# Patient Record
Sex: MALE | Race: OTHER
[De-identification: names, ages, dates, MRNs, and addresses within clinical notes are randomized per-mention and may not be internally consistent; named-entity substitution may affect disease eponyms.]

---

## 2019-09-02 ENCOUNTER — HOSPITAL ENCOUNTER (EMERGENCY)
Dept: HOSPITAL 54 - ER | Age: 19
LOS: 4 days | Discharge: HOME | End: 2019-09-06
Payer: SELF-PAY

## 2019-09-02 VITALS — HEIGHT: 64 IN | BODY MASS INDEX: 22.36 KG/M2 | WEIGHT: 131 LBS

## 2019-09-02 VITALS — DIASTOLIC BLOOD PRESSURE: 90 MMHG | SYSTOLIC BLOOD PRESSURE: 129 MMHG

## 2019-09-02 DIAGNOSIS — R94.31: ICD-10-CM

## 2019-09-02 DIAGNOSIS — F41.9: Primary | ICD-10-CM

## 2019-09-02 NOTE — NUR
patient BBRA C/O PT ANXIOUS AND PULLED OVER WHEN DRIVING TO HIS GIRLFRIEND'S 
HOUSE "HANDS TENSED UP" PER EMS REPORT. on room air, breathing evenly and 
unlabored. connected to the monitor and pulse ox. kept comfortable, will 
continue to monitor accordingly.